# Patient Record
Sex: MALE | Race: WHITE
[De-identification: names, ages, dates, MRNs, and addresses within clinical notes are randomized per-mention and may not be internally consistent; named-entity substitution may affect disease eponyms.]

---

## 2018-06-21 ENCOUNTER — HOSPITAL ENCOUNTER (OUTPATIENT)
Dept: HOSPITAL 47 - RADXRYALE | Age: 53
Discharge: HOME | End: 2018-06-21
Attending: PHYSICIAN ASSISTANT
Payer: COMMERCIAL

## 2018-06-21 ENCOUNTER — HOSPITAL ENCOUNTER (EMERGENCY)
Dept: HOSPITAL 47 - EC | Age: 53
Discharge: HOME | End: 2018-06-21
Payer: COMMERCIAL

## 2018-06-21 VITALS — RESPIRATION RATE: 18 BRPM

## 2018-06-21 VITALS — HEART RATE: 55 BPM | TEMPERATURE: 99.7 F | DIASTOLIC BLOOD PRESSURE: 81 MMHG | SYSTOLIC BLOOD PRESSURE: 164 MMHG

## 2018-06-21 DIAGNOSIS — F17.200: ICD-10-CM

## 2018-06-21 DIAGNOSIS — Z88.1: ICD-10-CM

## 2018-06-21 DIAGNOSIS — R10.30: Primary | ICD-10-CM

## 2018-06-21 DIAGNOSIS — K57.92: Primary | ICD-10-CM

## 2018-06-21 LAB
ALBUMIN SERPL-MCNC: 3.8 G/DL (ref 3.5–5)
ALP SERPL-CCNC: 84 U/L (ref 38–126)
ALT SERPL-CCNC: 43 U/L (ref 21–72)
ANION GAP SERPL CALC-SCNC: 12 MMOL/L
AST SERPL-CCNC: 23 U/L (ref 17–59)
BUN SERPL-SCNC: 9 MG/DL (ref 9–20)
CALCIUM SPEC-MCNC: 8.8 MG/DL (ref 8.4–10.2)
CHLORIDE SERPL-SCNC: 104 MMOL/L (ref 98–107)
CHOLEST SERPL-MCNC: 137 MG/DL (ref ?–200)
CO2 SERPL-SCNC: 24 MMOL/L (ref 22–30)
ERYTHROCYTE [DISTWIDTH] IN BLOOD BY AUTOMATED COUNT: 5 M/UL (ref 4.3–5.9)
ERYTHROCYTE [DISTWIDTH] IN BLOOD: 13.8 % (ref 11.5–15.5)
GLUCOSE SERPL-MCNC: 118 MG/DL (ref 74–99)
HCT VFR BLD AUTO: 44.7 % (ref 39–53)
HDLC SERPL-MCNC: 40 MG/DL (ref 40–60)
HGB BLD-MCNC: 15.1 GM/DL (ref 13–17.5)
LDLC SERPL CALC-MCNC: 73 MG/DL (ref 0–99)
MCH RBC QN AUTO: 30.2 PG (ref 25–35)
MCHC RBC AUTO-ENTMCNC: 33.8 G/DL (ref 31–37)
MCV RBC AUTO: 89.4 FL (ref 80–100)
PLATELET # BLD AUTO: 251 K/UL (ref 150–450)
POTASSIUM SERPL-SCNC: 3.8 MMOL/L (ref 3.5–5.1)
PROT SERPL-MCNC: 6.3 G/DL (ref 6.3–8.2)
SODIUM SERPL-SCNC: 140 MMOL/L (ref 137–145)
TRIGL SERPL-MCNC: 119 MG/DL (ref ?–150)
WBC # BLD AUTO: 13.9 K/UL (ref 3.8–10.6)

## 2018-06-21 PROCEDURE — 84443 ASSAY THYROID STIM HORMONE: CPT

## 2018-06-21 PROCEDURE — 99284 EMERGENCY DEPT VISIT MOD MDM: CPT

## 2018-06-21 PROCEDURE — 80061 LIPID PANEL: CPT

## 2018-06-21 PROCEDURE — 74177 CT ABD & PELVIS W/CONTRAST: CPT

## 2018-06-21 PROCEDURE — 82306 VITAMIN D 25 HYDROXY: CPT

## 2018-06-21 PROCEDURE — 80053 COMPREHEN METABOLIC PANEL: CPT

## 2018-06-21 PROCEDURE — 85027 COMPLETE CBC AUTOMATED: CPT

## 2018-06-21 PROCEDURE — 74019 RADEX ABDOMEN 2 VIEWS: CPT

## 2018-06-21 NOTE — CT
EXAMINATION TYPE: CT abdomen pelvis w con

 

DATE OF EXAM: 6/21/2018

 

COMPARISON: NONE

 

INDICATION: Right lower quadrant pain x 3 days, fever.

 

DLP: 1697 mGycm, Automated exposure control for dose reduction was used.

 

CONTRAST:  100 mL of Isovue M300. 

                        Study performed with Oral Contrast

 

TECHNIQUE: Axial images were obtained from above the diaphragm to the pubic rami in the axial plane a
t 5 mm thick sections.  Reconstructed images are reviewed on the computer in the coronal plane.  

 

FINDINGS:

 

Limited CT sections are obtained the lung bases.  The lung bases are clear.

 

CT ABDOMEN:

 

Liver: Normal

 

Spleen: Normal

 

Pancreas: Normal

 

Adrenal glands: The adrenal glands are normal.

 

Gallbladder: Normal  

 

Kidneys: No masses are evident. No hydronephrosis is present.   No cysts are present.  No renal stone
s are evident.

 

Aorta: Vascular calcification is within the aorta. 

 

Inferior vena cava: Normal.

 

CT PELVIS: 

Multiple diverticuli are throughout the sigmoid colon. There is thickening of the distal sigmoid colo
n. Inflammatory changes are adjacent. Findings are compatible with acute diverticulitis. This is slig
htly right of midline. No abscess is evident. No free air is evident.

 

Appendix: Normal as visualized.

 

Urinary bladder: Normal. 

 

Genitourinary structures: Prostate is unremarkable.

 

Osseous structures: No suspicious lytic or sclerotic lesions.

 

IMPRESSIONS:

1.  Acute diverticulitis distal sigmoid colon. This is central and right of midline within the pelvis
.

## 2018-06-21 NOTE — XR
EXAMINATION TYPE: XR abdomen 2V

 

DATE OF EXAM: 6/21/2018

 

COMPARISON: NONE

 

HISTORY: Pain

 

TECHNIQUE: One view abdominal series

 

FINDINGS:  

The osseous structures are intact.  The bowel gas pattern is nonspecific. Calcification the pelvis li
michael vascular..  

 

IMPRESSION:  

1.  Nonspecific abdomen.

## 2018-06-21 NOTE — ED
Abdominal Pain HPI





- General


Chief Complaint: Abdominal Pain


Time Seen by Provider: 06/21/18 16:14


Source: patient, RN notes reviewed


Mode of arrival: ambulatory


Limitations: no limitations





- History of Present Illness


Initial Comments: 





53-year-old male presented to the emergency department for abdominal pain.  

Patient outpatient CT and lab work shows uncomplicated acute diverticulitis.  

Patient has no history.  Denies any bowel surgeries or abdominal surgeries.  

Patient states that he is having mild constipation no diarrhea no melena or 

hematochezia.  Denies any nausea vomiting.  Patient states she's had no fever 

today felt that he had a few of the other day.





- Related Data


 Previous Rx's











 Medication  Instructions  Recorded


 


Ciprofloxacin HCl [Cipro] 500 mg PO Q12HR #20 tablet 06/21/18


 


metroNIDAZOLE [Flagyl] 500 mg PO TID #30 tab 06/21/18











 Allergies











Allergy/AdvReac Type Severity Reaction Status Date / Time


 


cephalexin [From Keflex] Allergy  Rash/Hives Verified 06/21/18 14:48














Review of Systems


ROS Statement: 


Those systems with pertinent positive or pertinent negative responses have been 

documented in the HPI.





ROS Other: All systems not noted in ROS Statement are negative.





Past Medical History


Past Medical History: Diabetes Mellitus, Hypertension


History of Any Multi-Drug Resistant Organisms: None Reported


Past Surgical History: Orthopedic Surgery


Past Psychological History: Depression


Smoking Status: Current every day smoker


Past Alcohol Use History: None Reported


Past Drug Use History: None Reported





General Exam


Limitations: no limitations


General appearance: alert, in no apparent distress


Eye exam: Present: normal appearance, PERRL, EOMI.  Absent: scleral icterus, 

conjunctival injection, periorbital swelling


Respiratory exam: Present: normal lung sounds bilaterally.  Absent: respiratory 

distress, wheezes, rales, rhonchi, stridor


Cardiovascular Exam: Present: regular rate, normal rhythm, normal heart sounds.

  Absent: systolic murmur, diastolic murmur, rubs, gallop, clicks


GI/Abdominal exam: Present: soft, tenderness (Minimal lower abdominal tenderness

), normal bowel sounds.  Absent: distended, guarding, rebound, rigid





Course





 Vital Signs











  06/21/18





  14:47


 


Temperature 98.8 F


 


Pulse Rate 101 H


 


Respiratory 18





Rate 


 


Blood Pressure 170/90


 


O2 Sat by Pulse 98





Oximetry 














Medical Decision Making





- Medical Decision Making





53-year-old male presented from it for abdominal pain outpatient CT which 

showed acute diverticulitis.  This is oncoming acute diverticulitis has no 

current fever patient will be given oral antibiotics and discharged with close 

follow-up.





- Lab Data


Result diagrams: 


 06/21/18 12:00





 06/21/18 12:00





 Lab Results











  06/21/18 06/21/18 Range/Units





  12:00 12:00 


 


WBC   13.9 H  (3.8-10.6)  k/uL


 


RBC   5.00  (4.30-5.90)  m/uL


 


Hgb   15.1  (13.0-17.5)  gm/dL


 


Hct   44.7  (39.0-53.0)  %


 


MCV   89.4  (80.0-100.0)  fL


 


MCH   30.2  (25.0-35.0)  pg


 


MCHC   33.8  (31.0-37.0)  g/dL


 


RDW   13.8  (11.5-15.5)  %


 


Plt Count   251  (150-450)  k/uL


 


Sodium  140   (137-145)  mmol/L


 


Potassium  3.8   (3.5-5.1)  mmol/L


 


Chloride  104   ()  mmol/L


 


Carbon Dioxide  24   (22-30)  mmol/L


 


Anion Gap  12   mmol/L


 


BUN  9   (9-20)  mg/dL


 


Creatinine  0.67   (0.66-1.25)  mg/dL


 


Est GFR (CKD-EPI)AfAm  >90   (>60 ml/min/1.73 sqM)  


 


Est GFR (CKD-EPI)NonAf  >90   (>60 ml/min/1.73 sqM)  


 


Glucose  118 H   (74-99)  mg/dL


 


Calcium  8.8   (8.4-10.2)  mg/dL


 


Total Bilirubin  0.5   (0.2-1.3)  mg/dL


 


AST  23   (17-59)  U/L


 


ALT  43   (21-72)  U/L


 


Alkaline Phosphatase  84   ()  U/L


 


Total Protein  6.3   (6.3-8.2)  g/dL


 


Albumin  3.8   (3.5-5.0)  g/dL


 


Triglycerides  119   (<150)  mg/dL


 


Cholesterol  137   (<200)  mg/dL


 


LDL Cholesterol, Calc  73   (0-99)  mg/dL


 


HDL Cholesterol  40   (40-60)  mg/dL


 


TSH  1.880   (0.465-4.680)  mIU/L














Disposition


Clinical Impression: 


 Diverticulitis





Disposition: HOME SELF-CARE


Condition: Stable


Instructions:  Diverticulitis (ED), Diverticulitis Diet (ED)


Additional Instructions: 


Please return to the Emergency Department if symptoms worsen or any other 

concerns.


Prescriptions: 


Ciprofloxacin HCl [Cipro] 500 mg PO Q12HR #20 tablet


metroNIDAZOLE [Flagyl] 500 mg PO TID #30 tab


Is patient prescribed a controlled substance at d/c from ED?: No


Referrals: 


Juan Carlos Ortiz DO [Primary Care Provider] - 1-2 days


Time of Disposition: 16:37

## 2018-09-20 ENCOUNTER — HOSPITAL ENCOUNTER (OUTPATIENT)
Dept: HOSPITAL 47 - RADCTMAIN | Age: 53
Discharge: HOME | End: 2018-09-20
Payer: COMMERCIAL

## 2018-09-20 DIAGNOSIS — K57.30: Primary | ICD-10-CM

## 2018-09-20 LAB — BUN SERPL-SCNC: 12 MG/DL (ref 9–20)

## 2018-09-20 PROCEDURE — 84520 ASSAY OF UREA NITROGEN: CPT

## 2018-09-20 PROCEDURE — 82565 ASSAY OF CREATININE: CPT

## 2018-09-20 PROCEDURE — 74177 CT ABD & PELVIS W/CONTRAST: CPT

## 2018-09-20 PROCEDURE — 36415 COLL VENOUS BLD VENIPUNCTURE: CPT

## 2018-09-21 NOTE — CT
EXAMINATION TYPE: CT abdomen pelvis w con

 

DATE OF EXAM: 9/20/2018

 

COMPARISON: CT abdomen and pelvis June 21, 2018

 

HISTORY: Follow up for diverticulitis.

 

CT DLP: 1751 mGycm, Automated Exposure Control for Dose Reduction was Utilized.

 

CONTRAST: 

CT scan of the abdomen and pelvis is performed with oral and with IV Contrast, patient injected with 
100ml mL of Isovue M300.

 

FINDINGS:

 

LUNG BASES: There is stable mild cardiomegaly.

 

LIVER/GB:   No significant abnormality is appreciated.

 

PANCREAS:  No significant abnormality is seen.

 

SPLEEN:  No significant abnormality is seen.

 

ADRENALS:  No significant abnormality is seen.

 

KIDNEYS:  No significant abnormality is seen.

 

BOWEL: Oral contrast reaches level of rectum. There is no suspicious small or large bowel dilatation.
 Normal-appearing appendix is seen descending from cecum. There are diverticula throughout the colon 
most prominent in the left and sigmoid colon. No significant inflammatory changes identified in curre
nt study to suggest active or acute diverticulitis. There is resolution of acute diverticulitis invol
ving the mid to distal sigmoid colon on prior study.

 

PROSTATE/SEMINAL VESICLES:  No gross abnormality seen.

 

LYMPH NODES:  No greater than 1cm abdominal or pelvic lymph nodes are appreciated.

 

OSSEOUS STRUCTURES: There is mild to moderate multilevel anterior and lateral spurring in the visuali
zed thoracic spine. There is mild disc space narrowing with vacuum disc phenomenon L4-L5 level. There
 is moderate disc space narrowing with vacuum disc phenomenon L5-S1 level.

 

OTHER: There is small fat-containing left inguinal hernia redemonstrated. There is tiny fat-containin
g umbilical hernia redemonstrated.

 

IMPRESSION: Interval resolution of acute diverticulitis mid to distal sigmoid colon. Persistent colon
ic diverticulosis without active diverticulitis currently.

## 2018-11-21 ENCOUNTER — HOSPITAL ENCOUNTER (OUTPATIENT)
Dept: HOSPITAL 47 - RADCTMAIN | Age: 53
End: 2018-11-21
Attending: NURSE PRACTITIONER
Payer: COMMERCIAL

## 2018-11-21 DIAGNOSIS — D72.820: ICD-10-CM

## 2018-11-21 DIAGNOSIS — D72.829: Primary | ICD-10-CM

## 2018-11-21 DIAGNOSIS — Z88.1: ICD-10-CM

## 2018-11-21 PROCEDURE — 71250 CT THORAX DX C-: CPT

## 2018-11-21 NOTE — CT
EXAMINATION TYPE: CT chest wo con

 

DATE OF EXAM: 11/21/2018

 

COMPARISON: None

 

HISTORY: family hx of lung ca

 

CT DLP: 809 mGycm, Automated exposure control for dose reduction was used.

 

CONTRAST: Performed injected with 0 mL of Isovue 300.

 

TECHNIQUE: Axial images were obtained at 5 mm thick sections.  Reconstructed images are reviewed on [employer] computer in the coronal plane. 

 

FINDINGS: Portion of the thyroid visualized is normal.

 

No suspicious lung nodules or focal infiltrates are present. No suspicious nodules or infiltrates are
 identified.

 

No enlarged mediastinal or hilar adenopathy is evident.   Scattered shotty lymphadenopathy is present
. The ascending aorta diameter at the level of the main pulmonary artery is 3.5 cm.  The main pulmona
ry artery diameter at the bifurcation is 3 cm.

 

Limited CT sections are obtained through the upper abdomen. Abdomen is essentially unremarkable.

 

IMPRESSIONS:

1. No suspicious acute changes CT chest. 

2. Low dose screening mammography can be performed if the patient is fits screening criteria.

## 2019-02-18 ENCOUNTER — HOSPITAL ENCOUNTER (OUTPATIENT)
Dept: HOSPITAL 47 - RADXRYALE | Age: 54
Discharge: HOME | End: 2019-02-18
Attending: PHYSICIAN ASSISTANT
Payer: COMMERCIAL

## 2019-02-18 DIAGNOSIS — M51.36: ICD-10-CM

## 2019-02-18 DIAGNOSIS — M47.815: ICD-10-CM

## 2019-02-18 DIAGNOSIS — M46.96: ICD-10-CM

## 2019-02-18 DIAGNOSIS — M51.37: Primary | ICD-10-CM

## 2019-02-18 PROCEDURE — 72110 X-RAY EXAM L-2 SPINE 4/>VWS: CPT

## 2019-02-18 NOTE — XR
EXAMINATION TYPE: XR lumbosacral spine min 4V

 

DATE OF EXAM: 2/18/2019

 

COMPARISON: Correlation CT 6/21/2018

 

HISTORY: 53-year-old male with low back pain

 

TECHNIQUE: 5 views

 

FINDINGS: 

5 lumbar type vertebral bodies. Minimal anterior wedging of T12 is unchanged and chronic. Moderate sp
ondylotic change thoracolumbar junction. Mild degenerative disc disease throughout the lumbar spine w
ith more mild to moderate disc height loss at L5-S1. No pars interarticularis defect.

 

 

 

IMPRESSION: 

1. Chronic minimal anterior wedging of T12. No vertebral compression collapse or malalignment.

2. Moderate endplate spondylosis thoracolumbar junction, mild degenerative disc disease throughout th
e remainder of the lumbar spine, and more moderate degenerative disc disease L5-S1.

3. Facet arthropathy lower lumbar spine.

## 2019-07-26 ENCOUNTER — HOSPITAL ENCOUNTER (INPATIENT)
Dept: HOSPITAL 47 - EC | Age: 54
LOS: 4 days | Discharge: HOME | DRG: 392 | End: 2019-07-30
Attending: INTERNAL MEDICINE | Admitting: INTERNAL MEDICINE
Payer: COMMERCIAL

## 2019-07-26 VITALS — BODY MASS INDEX: 42.7 KG/M2

## 2019-07-26 DIAGNOSIS — E11.65: ICD-10-CM

## 2019-07-26 DIAGNOSIS — Z79.84: ICD-10-CM

## 2019-07-26 DIAGNOSIS — Z79.899: ICD-10-CM

## 2019-07-26 DIAGNOSIS — Z79.2: ICD-10-CM

## 2019-07-26 DIAGNOSIS — R00.1: ICD-10-CM

## 2019-07-26 DIAGNOSIS — F32.9: ICD-10-CM

## 2019-07-26 DIAGNOSIS — Z88.1: ICD-10-CM

## 2019-07-26 DIAGNOSIS — F17.210: ICD-10-CM

## 2019-07-26 DIAGNOSIS — G47.33: ICD-10-CM

## 2019-07-26 DIAGNOSIS — I10: ICD-10-CM

## 2019-07-26 DIAGNOSIS — K57.92: Primary | ICD-10-CM

## 2019-07-26 LAB
ALBUMIN SERPL-MCNC: 4.1 G/DL (ref 3.5–5)
ALP SERPL-CCNC: 68 U/L (ref 38–126)
ALT SERPL-CCNC: 22 U/L (ref 21–72)
ANION GAP SERPL CALC-SCNC: 12 MMOL/L
AST SERPL-CCNC: 20 U/L (ref 17–59)
BASOPHILS # BLD AUTO: 0.1 K/UL (ref 0–0.2)
BASOPHILS NFR BLD AUTO: 0 %
BUN SERPL-SCNC: 15 MG/DL (ref 9–20)
CALCIUM SPEC-MCNC: 9.9 MG/DL (ref 8.4–10.2)
CHLORIDE SERPL-SCNC: 101 MMOL/L (ref 98–107)
CO2 SERPL-SCNC: 28 MMOL/L (ref 22–30)
EOSINOPHIL # BLD AUTO: 0.3 K/UL (ref 0–0.7)
EOSINOPHIL NFR BLD AUTO: 2 %
ERYTHROCYTE [DISTWIDTH] IN BLOOD BY AUTOMATED COUNT: 5.42 M/UL (ref 4.3–5.9)
ERYTHROCYTE [DISTWIDTH] IN BLOOD: 13 % (ref 11.5–15.5)
GLUCOSE SERPL-MCNC: 229 MG/DL (ref 74–99)
GLUCOSE UR QL: (no result)
HCT VFR BLD AUTO: 47.9 % (ref 39–53)
HGB BLD-MCNC: 15.9 GM/DL (ref 13–17.5)
LYMPHOCYTES # SPEC AUTO: 4.5 K/UL (ref 1–4.8)
LYMPHOCYTES NFR SPEC AUTO: 29 %
MCH RBC QN AUTO: 29.3 PG (ref 25–35)
MCHC RBC AUTO-ENTMCNC: 33.2 G/DL (ref 31–37)
MCV RBC AUTO: 88.3 FL (ref 80–100)
MONOCYTES # BLD AUTO: 1 K/UL (ref 0–1)
MONOCYTES NFR BLD AUTO: 6 %
NEUTROPHILS # BLD AUTO: 9.2 K/UL (ref 1.3–7.7)
NEUTROPHILS NFR BLD AUTO: 60 %
PH UR: 6 [PH] (ref 5–8)
PLATELET # BLD AUTO: 290 K/UL (ref 150–450)
POTASSIUM SERPL-SCNC: 3.7 MMOL/L (ref 3.5–5.1)
PROT SERPL-MCNC: 6.9 G/DL (ref 6.3–8.2)
SODIUM SERPL-SCNC: 141 MMOL/L (ref 137–145)
SP GR UR: 1.03 (ref 1–1.03)
UROBILINOGEN UR QL STRIP: <2 MG/DL (ref ?–2)
WBC # BLD AUTO: 15.3 K/UL (ref 3.8–10.6)

## 2019-07-26 PROCEDURE — 74177 CT ABD & PELVIS W/CONTRAST: CPT

## 2019-07-26 PROCEDURE — 87040 BLOOD CULTURE FOR BACTERIA: CPT

## 2019-07-26 PROCEDURE — 96365 THER/PROPH/DIAG IV INF INIT: CPT

## 2019-07-26 PROCEDURE — 81003 URINALYSIS AUTO W/O SCOPE: CPT

## 2019-07-26 PROCEDURE — 83690 ASSAY OF LIPASE: CPT

## 2019-07-26 PROCEDURE — 85025 COMPLETE CBC W/AUTO DIFF WBC: CPT

## 2019-07-26 PROCEDURE — 99285 EMERGENCY DEPT VISIT HI MDM: CPT

## 2019-07-26 PROCEDURE — 80053 COMPREHEN METABOLIC PANEL: CPT

## 2019-07-26 PROCEDURE — 80048 BASIC METABOLIC PNL TOTAL CA: CPT

## 2019-07-26 PROCEDURE — 83605 ASSAY OF LACTIC ACID: CPT

## 2019-07-26 PROCEDURE — 36415 COLL VENOUS BLD VENIPUNCTURE: CPT

## 2019-07-26 PROCEDURE — 96361 HYDRATE IV INFUSION ADD-ON: CPT

## 2019-07-26 NOTE — CT
EXAMINATION TYPE: CT abdomen pelvis w con

 

DATE OF EXAM: 7/26/2019

 

COMPARISON: 9/20/2018

 

HISTORY: abdominal pain, hx of diverticulitis.

 

CT DLP: 1812.8 mGycm

Automated exposure control for dose reduction was used.

 

TECHNIQUE:  Helical acquisition of images was performed from the lung bases through the pelvis.

 

CONTRAST: 

Performed without Oral Contrast and with IV Contrast, patient injected with 100 mL of Isovue 300.

 

FINDINGS: 

 

LUNG BASES: No findings, but mild cardiomegaly present.

 

LIVER/GB: No significant abnormality is appreciated.

 

PANCREAS: No significant abnormality is seen.

 

SPLEEN: No significant abnormality is seen.

 

ADRENALS: No significant abnormality is seen.

 

KIDNEYS: No significant abnormality is seen.

 

FREE AIR:  No free air is visualized.

 

RETROPERITONEAL ADENOPATHY:  None visualized

 

REPRODUCTIVE ORGANS: No significant abnormality is seen

 

URINARY BLADDER:  No significant abnormality is seen.

 

PELVIC ADENOPATHY:  None visualized.

 

OSSEOUS STRUCTURES:  No significant abnormality is seen.

 

BOWEL:  There are marked diverticulosis changes throughout the sigmoid colon and the descending colon
, and if colonoscopy has not been obtained recently, then eventual follow-up characterization is mague
mmended to assure normal underlying mucosa. 

 

At the splenic flexure there is pericolonic edematous reticulation of the proximal descending mesocol
on. This is associated with mild thickening and indistinctness to the lateral conal fascia and the la
teral margin of Gerota's fascia. Findings are consistent with mild diverticulitis.

 

OTHER: No acute vascular findings.

 

IMPRESSION: 

MILD DIVERTICULITIS INVOLVING THE PROXIMAL MOST DESCENDING COLON.

## 2019-07-26 NOTE — ED
General Adult HPI





<Alirio Abraham - Last Filed: 07/26/19 22:05>





- General


Source: patient, RN notes reviewed, old records reviewed


Mode of arrival: ambulatory


Limitations: no limitations





<Ramana Hassan - Last Filed: 07/26/19 22:11>





- General


Chief complaint: Abdominal Pain


Stated complaint: Abn labs


Time Seen by Provider: 07/26/19 18:41





- History of Present Illness


Initial comments: 


54-year-old male patient passed no history of type 2 diabetes, hypertension, 

recurrent diverticulitis presents to ED with approximately 2 weeks of left lower

quadrant pain.  Patient has been followed by his primary care provider Dr. Mclain and has been taking oral antibiotics for presumed diverticulitis.  

Patient reports states that approximately 1.5 weeks of ciprofloxacin, he 

discontinued taking ciprofloxacin on Tuesday due to a rash and itchiness that he

developed.  Patient had his labs drawn by his primary care provider yesterday, 

the results returned today which showed that he is not having laboratory 

improvements.  He was then recommended to present to the ED for presumed 

admission and IV antibiotics.  Patient reports that he has a severe ALLERGY to 

Keflex, however has been taking penicillins without difficulty.  He reports 

recently taking Augmentin without difficulty.  Denies any other complaints at 

this time.  Denies any nausea vomiting diarrhea, chest pain or shortness of 

breath.  Does report that he has had a subjective waxing and waning fever over 

this time period. 





Systemic: Pt denies fatigue, fever/chills, rash. Pt denies weakness, night 

sweats, weight loss. 


Neuro: Pt denies headache, visual disturbances, syncope or pre-syncope.


HEENT: Pt denies ocular discharge or irritation, otalgia, rhinorrhea, 

pharyngitis or notable lymphadenopathy. 


Cardiopulmonary: Pt denies chest pain, SOB, heart palpitations, dyspnea on 

exertion.  


Abdominal/GI: Pt denies n/v/d. 


: Pt denies dysuria, burning w/ urination, frequency/urgency. Denies new onset

urinary or bowel incontinence.  


MSK: Pt denies myalgia, loss of strength or function in extremities. 


Neuro: Pt denies new onset weakness, paresthesias. 


 (Ramana Hassan)





- Related Data


                                Home Medications











 Medication  Instructions  Recorded  Confirmed


 


Hydrochlorothiazide [Hydrodiuril] 25 mg PO DAILY 06/21/18 07/26/19


 


Metoprolol Succinate [Toprol XL] 200 mg PO DAILY 06/21/18 07/26/19


 


PARoxetine HCL [Paxil Cr] 37.5 mg PO DAILY 06/21/18 07/26/19


 


Amoxic-Pot Clav 875-125Mg 1 tab PO Q12HR 07/26/19 07/26/19





[Augmentin 875-125]   


 


Empagliflozin [Jardiance] 25 mg PO DAILY 07/26/19 07/26/19


 


Ergocalciferol (Vitamin D2) 50,000 unit PO Q7D 07/26/19 07/26/19





[Vitamin D2]   


 


metFORMIN HCL ER [Glucophage Xr] 1,000 mg PO BID 07/26/19 07/26/19











                                    Allergies











Allergy/AdvReac Type Severity Reaction Status Date / Time


 


cephalexin [From Keflex] Allergy  Rash/Hives Verified 07/26/19 19:39


 


ciprofloxacin [From Cipro] Allergy  Rash/Hives Verified 07/26/19 19:39














Review of Systems


ROS Other: All systems not noted in ROS Statement are negative.





<Alirio Abraham - Last Filed: 07/26/19 22:05>


ROS Other: All systems not noted in ROS Statement are negative.





<Ramana Hassan - Last Filed: 07/26/19 22:11>


ROS Statement: 


Those systems with pertinent positive or pertinent negative responses have been 

documented in the HPI.








Past Medical History


Past Medical History: Diabetes Mellitus, Hypertension


History of Any Multi-Drug Resistant Organisms: None Reported


Past Surgical History: Orthopedic Surgery


Additional Past Surgical History / Comment(s): colonoscopy, R finger 

reattachment, sinus surgery


Past Psychological History: Depression


Smoking Status: Current every day smoker


Past Alcohol Use History: None Reported


Past Drug Use History: None Reported





<Ramana Hassan - Last Filed: 07/26/19 22:11>





General Exam


Limitations: no limitations





<Ramana Hassan - Last Filed: 07/26/19 22:11>





- General Exam Comments


Initial Comments: 





Constitutional: NAD, AOX3, Pt has pleasant affect. 


HEENT: NC/AT, trachea midline, neck supple, no lymphadenopathy. Posterior 

pharynx non erythematous, without exudates. External ears appear normal, without

discharge. Mucous membranes moist. Eyes PERRLA, EOM intact. There is no scleral 

icterus. No pallor noted. 


Cardiopulmonary: RRR, no murmurs, rubs or gallops, no JVD noted. Lungs CTAB in 

anterior and posterior fields. No peripheral edema. 


Abdominal exam: Abdomen soft and non-distended.  Abdomen mildly tender to 

palpation in left lower quadrant, no ecchymoses, and no guarding no JVD.. Bowel 

sounds active in LLQ. No hepatosplenomegaly. No ecchymosis


Neuro: CN II-XII grossly intact. No nuchal rigidity. No raccon eyes, no rodriguez 

sign, no hemotympanum. No cervical spinal tenderness. 


MSK: No posterior calf tenderness bilaterally, homans sign negative bilaterally.

Posterior tibialis and radial pulse +2 bilaterally. Sensation intact in upper 

and lower extremities. Full active ROM in upper and lower extremities, 5/5 

stregnth. 


 (Ramana Hassan)





Course





<Alirio Abraham - Last Filed: 07/26/19 22:05>


                                   Vital Signs











  07/26/19 07/26/19 07/26/19





  17:52 20:29 21:25


 


Temperature 98.6 F 98.6 F 98 F


 


Pulse Rate 74 63 56 L


 


Respiratory 18 18 18





Rate   


 


Blood Pressure 138/88 141/81 141/78


 


O2 Sat by Pulse 97 97 96





Oximetry   














- Reevaluation(s)


Reevaluation #1: 





07/26/19 22:05


PA supervision: I proceeded evaluate this case and the patient will be admitted 

for IV antibiotics.  I did discuss case with Dr. Tafoya (Alirio Abraham)





Medical Decision Making





- Lab Data


Result diagrams: 


                                 07/26/19 19:53





                                 07/26/19 19:53





<Alirio Abraham - Last Filed: 07/26/19 22:05>





- Lab Data


Result diagrams: 


                                 07/26/19 19:53





                                 07/26/19 19:53





<Ramana Hassan - Last Filed: 07/26/19 22:11>





- Medical Decision Making


54-year-old male patient passed no history of type 2 diabetes, hypertension, 

recurrent diverticulitis presents to ED with approximately 2 weeks of left lower

quadrant pain.  Patient has been followed by his primary care provider Dr. Mclain and has been taking oral antibiotics for presumed diverticulitis.  

Patient reports states that approximately 1.5 weeks of ciprofloxacin, he 

discontinued taking ciprofloxacin on Tuesday due to a rash and itchiness that he

developed.  Patient had his labs drawn by his primary care provider yesterday, 

the results returned today which showed that he is not having laboratory 

improvements.  He was then recommended to present to the ED for presumed 

admission and IV antibiotics.  Patient reports that he has a severe ALLERGY to 

Keflex, however has been taking penicillins without difficulty.  He reports 

recently taking Augmentin without difficulty.  Denies any other complaints at 

this time.  Denies any nausea vomiting diarrhea, chest pain or shortness of 

breath.  Does report that he has had a subjective waxing and waning fever over 

this time period.  Patient bowel sounds stable, afebrile. Abdomen soft and non-

distended.  Abdomen mildly tender to palpation in left lower quadrant, no 

ecchymoses, and no guarding no JVD. Bowel sounds active in LLQ. No 

hepatosplenomegaly. No ecchymosis.  Laboratory investigations revealed mild 

leukocytosis of 15.  CVA revealed mild hyperglycemia.  Lactic acid 1.8.  UA 

revealed +4 glucose.  CT abdomen and pelvis displayed mild diverticulitis 

involving proximalmost ascending colon.  Patient started on Zosyn.  Patient 

admitted to Dr. Tafoya. Case discused with Dr. Abraham. 


 (Ramana Hassan)





- Lab Data


                                   Lab Results











  07/26/19 07/26/19 07/26/19 Range/Units





  19:53 19:53 19:53 


 


WBC  15.3 H    (3.8-10.6)  k/uL


 


RBC  5.42    (4.30-5.90)  m/uL


 


Hgb  15.9    (13.0-17.5)  gm/dL


 


Hct  47.9    (39.0-53.0)  %


 


MCV  88.3    (80.0-100.0)  fL


 


MCH  29.3    (25.0-35.0)  pg


 


MCHC  33.2    (31.0-37.0)  g/dL


 


RDW  13.0    (11.5-15.5)  %


 


Plt Count  290    (150-450)  k/uL


 


Neutrophils %  60    %


 


Lymphocytes %  29    %


 


Monocytes %  6    %


 


Eosinophils %  2    %


 


Basophils %  0    %


 


Neutrophils #  9.2 H    (1.3-7.7)  k/uL


 


Lymphocytes #  4.5    (1.0-4.8)  k/uL


 


Monocytes #  1.0    (0-1.0)  k/uL


 


Eosinophils #  0.3    (0-0.7)  k/uL


 


Basophils #  0.1    (0-0.2)  k/uL


 


Sodium   141   (137-145)  mmol/L


 


Potassium   3.7   (3.5-5.1)  mmol/L


 


Chloride   101   ()  mmol/L


 


Carbon Dioxide   28   (22-30)  mmol/L


 


Anion Gap   12   mmol/L


 


BUN   15   (9-20)  mg/dL


 


Creatinine   0.83   (0.66-1.25)  mg/dL


 


Est GFR (CKD-EPI)AfAm   >90   (>60 ml/min/1.73 sqM)  


 


Est GFR (CKD-EPI)NonAf   >90   (>60 ml/min/1.73 sqM)  


 


Glucose   229 H   (74-99)  mg/dL


 


Plasma Lactic Acid Demetri    1.8  (0.7-2.0)  mmol/L


 


Calcium   9.9   (8.4-10.2)  mg/dL


 


Total Bilirubin   0.5   (0.2-1.3)  mg/dL


 


AST   20   (17-59)  U/L


 


ALT   22   (21-72)  U/L


 


Alkaline Phosphatase   68   ()  U/L


 


Total Protein   6.9   (6.3-8.2)  g/dL


 


Albumin   4.1   (3.5-5.0)  g/dL


 


Lipase   194   ()  U/L


 


Urine Color     


 


Urine Appearance     (Clear)  


 


Urine pH     (5.0-8.0)  


 


Ur Specific Gravity     (1.001-1.035)  


 


Urine Protein     (Negative)  


 


Urine Glucose (UA)     (Negative)  


 


Urine Ketones     (Negative)  


 


Urine Blood     (Negative)  


 


Urine Nitrite     (Negative)  


 


Urine Bilirubin     (Negative)  


 


Urine Urobilinogen     (<2.0)  mg/dL


 


Ur Leukocyte Esterase     (Negative)  














  07/26/19 Range/Units





  19:53 


 


WBC   (3.8-10.6)  k/uL


 


RBC   (4.30-5.90)  m/uL


 


Hgb   (13.0-17.5)  gm/dL


 


Hct   (39.0-53.0)  %


 


MCV   (80.0-100.0)  fL


 


MCH   (25.0-35.0)  pg


 


MCHC   (31.0-37.0)  g/dL


 


RDW   (11.5-15.5)  %


 


Plt Count   (150-450)  k/uL


 


Neutrophils %   %


 


Lymphocytes %   %


 


Monocytes %   %


 


Eosinophils %   %


 


Basophils %   %


 


Neutrophils #   (1.3-7.7)  k/uL


 


Lymphocytes #   (1.0-4.8)  k/uL


 


Monocytes #   (0-1.0)  k/uL


 


Eosinophils #   (0-0.7)  k/uL


 


Basophils #   (0-0.2)  k/uL


 


Sodium   (137-145)  mmol/L


 


Potassium   (3.5-5.1)  mmol/L


 


Chloride   ()  mmol/L


 


Carbon Dioxide   (22-30)  mmol/L


 


Anion Gap   mmol/L


 


BUN   (9-20)  mg/dL


 


Creatinine   (0.66-1.25)  mg/dL


 


Est GFR (CKD-EPI)AfAm   (>60 ml/min/1.73 sqM)  


 


Est GFR (CKD-EPI)NonAf   (>60 ml/min/1.73 sqM)  


 


Glucose   (74-99)  mg/dL


 


Plasma Lactic Acid Demetri   (0.7-2.0)  mmol/L


 


Calcium   (8.4-10.2)  mg/dL


 


Total Bilirubin   (0.2-1.3)  mg/dL


 


AST   (17-59)  U/L


 


ALT   (21-72)  U/L


 


Alkaline Phosphatase   ()  U/L


 


Total Protein   (6.3-8.2)  g/dL


 


Albumin   (3.5-5.0)  g/dL


 


Lipase   ()  U/L


 


Urine Color  Yellow  


 


Urine Appearance  Clear  (Clear)  


 


Urine pH  6.0  (5.0-8.0)  


 


Ur Specific Gravity  1.031  (1.001-1.035)  


 


Urine Protein  Negative  (Negative)  


 


Urine Glucose (UA)  4+ H  (Negative)  


 


Urine Ketones  Negative  (Negative)  


 


Urine Blood  Negative  (Negative)  


 


Urine Nitrite  Negative  (Negative)  


 


Urine Bilirubin  Negative  (Negative)  


 


Urine Urobilinogen  <2.0  (<2.0)  mg/dL


 


Ur Leukocyte Esterase  Negative  (Negative)  














Disposition





<Alirio Abraham - Last Filed: 07/26/19 22:05>


Is patient prescribed a controlled substance at d/c from ED?: No





<Ramana Hassan - Last Filed: 07/26/19 22:11>


Clinical Impression: 


 Diverticulitis





Disposition: ADMITTED AS IP TO THIS HOSP


Condition: Serious


Referrals: 


Juan Carlos Ortiz DO [Primary Care Provider] - 1-2 days

## 2019-07-27 LAB
ALBUMIN SERPL-MCNC: 3.3 G/DL (ref 3.5–5)
ALP SERPL-CCNC: 61 U/L (ref 38–126)
ALT SERPL-CCNC: 25 U/L (ref 21–72)
ANION GAP SERPL CALC-SCNC: 6 MMOL/L
AST SERPL-CCNC: 18 U/L (ref 17–59)
BASOPHILS # BLD AUTO: 0.1 K/UL (ref 0–0.2)
BASOPHILS NFR BLD AUTO: 1 %
BUN SERPL-SCNC: 12 MG/DL (ref 9–20)
CALCIUM SPEC-MCNC: 8.8 MG/DL (ref 8.4–10.2)
CHLORIDE SERPL-SCNC: 106 MMOL/L (ref 98–107)
CO2 SERPL-SCNC: 28 MMOL/L (ref 22–30)
EOSINOPHIL # BLD AUTO: 0.4 K/UL (ref 0–0.7)
EOSINOPHIL NFR BLD AUTO: 4 %
ERYTHROCYTE [DISTWIDTH] IN BLOOD BY AUTOMATED COUNT: 4.86 M/UL (ref 4.3–5.9)
ERYTHROCYTE [DISTWIDTH] IN BLOOD: 14.6 % (ref 11.5–15.5)
GLUCOSE BLD-MCNC: 125 MG/DL (ref 75–99)
GLUCOSE BLD-MCNC: 180 MG/DL (ref 75–99)
GLUCOSE BLD-MCNC: 191 MG/DL (ref 75–99)
GLUCOSE BLD-MCNC: 244 MG/DL (ref 75–99)
GLUCOSE SERPL-MCNC: 179 MG/DL (ref 74–99)
HCT VFR BLD AUTO: 44.9 % (ref 39–53)
HGB BLD-MCNC: 14.5 GM/DL (ref 13–17.5)
LYMPHOCYTES # SPEC AUTO: 2.9 K/UL (ref 1–4.8)
LYMPHOCYTES NFR SPEC AUTO: 32 %
MCH RBC QN AUTO: 29.8 PG (ref 25–35)
MCHC RBC AUTO-ENTMCNC: 32.3 G/DL (ref 31–37)
MCV RBC AUTO: 92.3 FL (ref 80–100)
MONOCYTES # BLD AUTO: 0.6 K/UL (ref 0–1)
MONOCYTES NFR BLD AUTO: 7 %
NEUTROPHILS # BLD AUTO: 5 K/UL (ref 1.3–7.7)
NEUTROPHILS NFR BLD AUTO: 55 %
PLATELET # BLD AUTO: 274 K/UL (ref 150–450)
POTASSIUM SERPL-SCNC: 4.2 MMOL/L (ref 3.5–5.1)
PROT SERPL-MCNC: 5.9 G/DL (ref 6.3–8.2)
SODIUM SERPL-SCNC: 140 MMOL/L (ref 137–145)
WBC # BLD AUTO: 9.1 K/UL (ref 3.8–10.6)

## 2019-07-27 RX ADMIN — INSULIN ASPART SCH: 100 INJECTION, SOLUTION INTRAVENOUS; SUBCUTANEOUS at 16:53

## 2019-07-27 RX ADMIN — CEFAZOLIN SCH MLS/HR: 330 INJECTION, POWDER, FOR SOLUTION INTRAMUSCULAR; INTRAVENOUS at 04:00

## 2019-07-27 RX ADMIN — PIPERACILLIN AND TAZOBACTAM SCH MLS/HR: 3; .375 INJECTION, POWDER, FOR SOLUTION INTRAVENOUS at 03:59

## 2019-07-27 RX ADMIN — INSULIN ASPART SCH: 100 INJECTION, SOLUTION INTRAVENOUS; SUBCUTANEOUS at 13:14

## 2019-07-27 RX ADMIN — PIPERACILLIN AND TAZOBACTAM SCH MLS/HR: 3; .375 INJECTION, POWDER, FOR SOLUTION INTRAVENOUS at 10:48

## 2019-07-27 RX ADMIN — INSULIN ASPART SCH: 100 INJECTION, SOLUTION INTRAVENOUS; SUBCUTANEOUS at 07:48

## 2019-07-27 RX ADMIN — PIPERACILLIN AND TAZOBACTAM SCH MLS/HR: 3; .375 INJECTION, POWDER, FOR SOLUTION INTRAVENOUS at 20:41

## 2019-07-27 RX ADMIN — CEFAZOLIN SCH MLS/HR: 330 INJECTION, POWDER, FOR SOLUTION INTRAMUSCULAR; INTRAVENOUS at 10:48

## 2019-07-27 RX ADMIN — CEFAZOLIN SCH MLS/HR: 330 INJECTION, POWDER, FOR SOLUTION INTRAMUSCULAR; INTRAVENOUS at 00:29

## 2019-07-27 RX ADMIN — CEFAZOLIN SCH MLS/HR: 330 INJECTION, POWDER, FOR SOLUTION INTRAMUSCULAR; INTRAVENOUS at 16:45

## 2019-07-27 RX ADMIN — INSULIN ASPART SCH UNIT: 100 INJECTION, SOLUTION INTRAVENOUS; SUBCUTANEOUS at 20:41

## 2019-07-27 RX ADMIN — HYDROMORPHONE HYDROCHLORIDE PRN MG: 1 INJECTION, SOLUTION INTRAMUSCULAR; INTRAVENOUS; SUBCUTANEOUS at 10:48

## 2019-07-27 RX ADMIN — HYDROMORPHONE HYDROCHLORIDE PRN MG: 1 INJECTION, SOLUTION INTRAMUSCULAR; INTRAVENOUS; SUBCUTANEOUS at 16:50

## 2019-07-28 LAB
GLUCOSE BLD-MCNC: 128 MG/DL (ref 75–99)
GLUCOSE BLD-MCNC: 168 MG/DL (ref 75–99)
GLUCOSE BLD-MCNC: 173 MG/DL (ref 75–99)
GLUCOSE BLD-MCNC: 182 MG/DL (ref 75–99)

## 2019-07-28 RX ADMIN — HYDROMORPHONE HYDROCHLORIDE PRN MG: 1 INJECTION, SOLUTION INTRAMUSCULAR; INTRAVENOUS; SUBCUTANEOUS at 07:30

## 2019-07-28 RX ADMIN — INSULIN ASPART SCH UNIT: 100 INJECTION, SOLUTION INTRAVENOUS; SUBCUTANEOUS at 07:30

## 2019-07-28 RX ADMIN — HYDROMORPHONE HYDROCHLORIDE PRN MG: 1 INJECTION, SOLUTION INTRAMUSCULAR; INTRAVENOUS; SUBCUTANEOUS at 20:37

## 2019-07-28 RX ADMIN — PIPERACILLIN AND TAZOBACTAM SCH MLS/HR: 3; .375 INJECTION, POWDER, FOR SOLUTION INTRAVENOUS at 20:36

## 2019-07-28 RX ADMIN — CEFAZOLIN SCH MLS/HR: 330 INJECTION, POWDER, FOR SOLUTION INTRAMUSCULAR; INTRAVENOUS at 20:37

## 2019-07-28 RX ADMIN — INSULIN ASPART SCH: 100 INJECTION, SOLUTION INTRAVENOUS; SUBCUTANEOUS at 17:12

## 2019-07-28 RX ADMIN — HEPARIN SODIUM SCH UNIT: 5000 INJECTION, SOLUTION INTRAVENOUS; SUBCUTANEOUS at 23:44

## 2019-07-28 RX ADMIN — CEFAZOLIN SCH: 330 INJECTION, POWDER, FOR SOLUTION INTRAMUSCULAR; INTRAVENOUS at 03:47

## 2019-07-28 RX ADMIN — HYDROMORPHONE HYDROCHLORIDE PRN MG: 1 INJECTION, SOLUTION INTRAMUSCULAR; INTRAVENOUS; SUBCUTANEOUS at 12:10

## 2019-07-28 RX ADMIN — CEFAZOLIN SCH MLS/HR: 330 INJECTION, POWDER, FOR SOLUTION INTRAMUSCULAR; INTRAVENOUS at 15:22

## 2019-07-28 RX ADMIN — HEPARIN SODIUM SCH UNIT: 5000 INJECTION, SOLUTION INTRAVENOUS; SUBCUTANEOUS at 15:21

## 2019-07-28 RX ADMIN — PIPERACILLIN AND TAZOBACTAM SCH MLS/HR: 3; .375 INJECTION, POWDER, FOR SOLUTION INTRAVENOUS at 03:59

## 2019-07-28 RX ADMIN — CEFAZOLIN SCH: 330 INJECTION, POWDER, FOR SOLUTION INTRAMUSCULAR; INTRAVENOUS at 08:40

## 2019-07-28 RX ADMIN — PIPERACILLIN AND TAZOBACTAM SCH MLS/HR: 3; .375 INJECTION, POWDER, FOR SOLUTION INTRAVENOUS at 12:29

## 2019-07-28 RX ADMIN — INSULIN ASPART SCH UNIT: 100 INJECTION, SOLUTION INTRAVENOUS; SUBCUTANEOUS at 12:10

## 2019-07-28 RX ADMIN — INSULIN ASPART SCH UNIT: 100 INJECTION, SOLUTION INTRAVENOUS; SUBCUTANEOUS at 21:42

## 2019-07-28 NOTE — P.HPIM
History of Present Illness


H&P Date: 07/27/19


Chief Complaint: Abdominal pain





Patient is a 54-year-old male with a known history of diabetes type 2 

non-insulin-dependent, hypertension, upset to sleep apnea and recurrent d

iverticulitis came to ER with complaints of left lower quadrant abdominal pain. 

Patient has been followed by his primary care provider Dr. Mclain and has 

been taking oral antibiotics for presumed diverticulitis.  Patient reports 

states that approximately 1.5 weeks of ciprofloxacin, he discontinued taking 

ciprofloxacin on Tuesday due to a rash and itchiness that he developed.  Patient

had his labs drawn by his primary care provider, the results returned today 

which showed that he is not having laboratory improvements.  He was then 

recommended to present to the ED for presumed admission and IV antibiotics.  

Patient reports that he has a severe ALLERGY to Keflex, however has been taking 

penicillins without difficulty.  He reports recently taking Augmentin without 

difficulty.  Denies any other complaints at this time.  Denies any nausea 

vomiting diarrhea, chest pain or shortness of breath.  Does report that he has 

had a subjective waxing and waning fever over this time period. 





CT abdomen and pelvis showed mild diverticulitis involving the proximal most 

descending colon.











Review of Systems





Constitutional: Patient denies any fever or chills .  No generalized weakness or

weight loss.  


Abdomen: Patient denied nausea vomiting and diarrhea.  Patient does have 

abdominal pain.


Cardiovascular: Patient denies any chest pain or short of breath no 

palpitations.


Respiratory: patient denied any cough is from production.  No shortness of 

breath


Neurologic: Patient denied any numbness or tingling headache.


Musculoskeletal: Patient denies any complaints of joint swelling or deformity.


Skin: Negative


Psychiatric: Negative


Endocrine: No heat or cold intolerance.  No recent weight gain.


Genitourinary: No dysuria or hematuria.


All other 14 point ROS negative except the above





Past Medical History


Past Medical History: Diabetes Mellitus, Hypertension, Pneumonia, Sleep 

Apnea/CPAP/BIPAP


History of Any Multi-Drug Resistant Organisms: None Reported


Past Surgical History: Orthopedic Surgery


Additional Past Surgical History / Comment(s): colonoscopy, R finger 

reattachment, sinus surgery


Past Anesthesia/Blood Transfusion Reactions: No Reported Reaction


Past Psychological History: Depression


Smoking Status: Current every day smoker


Past Alcohol Use History: None Reported


Past Drug Use History: None Reported





- Past Family History


  ** Father


Family Medical History: CVA/TIA





  ** Mother


Additional Family Medical History / Comment(s): heart valve problems





Medications and Allergies


                                Home Medications











 Medication  Instructions  Recorded  Confirmed  Type


 


Hydrochlorothiazide [Hydrodiuril] 25 mg PO DAILY 06/21/18 07/26/19 History


 


Metoprolol Succinate [Toprol XL] 200 mg PO DAILY 06/21/18 07/26/19 History


 


PARoxetine HCL [Paxil Cr] 37.5 mg PO DAILY 06/21/18 07/26/19 History


 


Amoxic-Pot Clav 875-125Mg 1 tab PO Q12HR 07/26/19 07/26/19 History





[Augmentin 875-125]    


 


Empagliflozin [Jardiance] 25 mg PO DAILY 07/26/19 07/26/19 History


 


Ergocalciferol (Vitamin D2) 50,000 unit PO Q7D 07/26/19 07/26/19 History





[Vitamin D2]    


 


metFORMIN HCL ER [Glucophage Xr] 1,000 mg PO BID 07/26/19 07/26/19 History








                                    Allergies











Allergy/AdvReac Type Severity Reaction Status Date / Time


 


cephalexin [From Keflex] Allergy  Rash/Hives Verified 07/26/19 19:39


 


ciprofloxacin [From Cipro] Allergy  Rash/Hives Verified 07/26/19 19:39














Physical Exam


Vitals: 


                                   Vital Signs











  Temp Pulse Resp BP BP Pulse Ox


 


 07/28/19 05:00  97 F L  59 L  20  143/80   98


 


 07/27/19 21:23  97.7 F  54 L  20  167/97   97


 


 07/27/19 12:15  97.3 F L  51 L  16   134/64  97








                                Intake and Output











 07/27/19 07/28/19 07/28/19





 22:59 06:59 14:59


 


Intake Total 200 100 


 


Balance 200 100 


 


Intake:   


 


  Oral 200 100 


 


Other:   


 


  Voiding Method Toilet  


 


  # Voids 1 2 














PHYSICAL EXAMINATION: 


Patient is lying in the bed comfortably, no acute distress, awake alert and 

oriented.. 


HEENT: Normocephalic. Neck is supple. Pupils reactive. Nostrils clear. Oral 

cavity is moist. Ears reveal no drainage. 


Neck reveals no JVD, carotid bruits, or thyromegaly. 


CHEST EXAMINATION: Trachea is central. Symmetrical expansion. Lung fields clear 

to auscultation and percussion. 


CARDIAC: Normal S1, S2 with no gallops. No murmurs 


ABDOMEN: Soft.  Left lower quadrant tenderness.  No guarding no rigidity.  Bowel

sounds normal. No organomegaly. No abdominal bruits. 


Extremities: reveal no edema.  No clubbing or cyanosis


Neurologically awake, alert, oriented x3 with well-coordinated movements.  No 

focal deficits noted


Skin: No rash or skin lesions. 


Psychiatric: Coperative.  Nonsuicidal


Musculoskeletal: No joint swelling or deformity.  Normal range of motion.








Results


CBC & Chem 7: 


                                 07/27/19 10:55





                                 07/27/19 10:55


Labs: 


                  Abnormal Lab Results - Last 24 Hours (Table)











  07/27/19 07/27/19 07/27/19 Range/Units





  10:55 12:07 16:53 


 


Glucose  179 H    (74-99)  mg/dL


 


POC Glucose (mg/dL)   180 H  125 H  (75-99)  mg/dL


 


Total Protein  5.9 L    (6.3-8.2)  g/dL


 


Albumin  3.3 L    (3.5-5.0)  g/dL














  07/27/19 07/28/19 Range/Units





  20:20 07:05 


 


Glucose    (74-99)  mg/dL


 


POC Glucose (mg/dL)  244 H  173 H  (75-99)  mg/dL


 


Total Protein    (6.3-8.2)  g/dL


 


Albumin    (3.5-5.0)  g/dL








                      Microbiology - Last 24 Hours (Table)











 07/26/19 19:53 Blood Culture - Preliminary





 Blood    No Growth after 24 hours














Thrombosis Risk Factor Assmnt





- DVT/VTE Prophylaxis


DVT/VTE Prophylaxis: Pharmacologic Prophylaxis ordered





- Choose All That Apply


Any of the Below Risk Factors Present?: Yes


Each Factor Represents 1 point: Age 41-60 years, Obesity (BMI >25)


Other Risk Factors: No


Other congenital or acquired thrombophilia - If yes, enter type in comment: No


Thrombosis Risk Factor Assessment Total Risk Factor Score: 2


Thrombosis Risk Factor Assessment Level: Low Risk





Assessment and Plan


Assessment: 





Acute recurrent diverticulitis.  Failed outpatient therapy with Augmentin.


Diabetes type 2 non-insulin-dependent


Hypertension


Obstructive sleep apnea


Multiple be stated BMI 41.8


Depression


Nicotine addiction


DVT prophylaxis with heparin subcu





Plan:


Patient be continued on antibiotics the form of Zosyn.  Continue the pain 

medications with Dilaudid.  Currently patient will be started on liquid diet and

advance as tolerated.  Metoprolol is on hold due to bradycardia.  Will hold 

hydrochlorothiazide as well.  Blood pressure is not elevated.  Continue with 

insulin sliding scale.  Symptomatic management for nausea.  Further 

recommendations based on the clinical course.








Time with Patient: Greater than 30

## 2019-07-28 NOTE — P.PN
Subjective


Progress Note Date: 07/28/19


Principal diagnosis: 





Acute Diverticulitis





Patient is a 54-year-old male with a known history of diabetes type 2 

non-insulin-dependent, hypertension, upset to sleep apnea and recurrent 

diverticulitis came to ER with complaints of left lower quadrant abdominal pain.

Patient has been followed by his primary care provider Dr. Mclain and has 

been taking oral antibiotics for presumed diverticulitis.  Patient reports 

states that approximately 1.5 weeks of ciprofloxacin, he discontinued taking 

ciprofloxacin on Tuesday due to a rash and itchiness that he developed.  Patient

had his labs drawn by his primary care provider, the results returned today 

which showed that he is not having laboratory improvements.  He was then 

recommended to present to the ED for presumed admission and IV antibiotics.  

Patient reports that he has a severe ALLERGY to Keflex, however has been taking 

penicillins without difficulty.  He reports recently taking Augmentin without 

difficulty.  Denies any other complaints at this time.  Denies any nausea 

vomiting diarrhea, chest pain or shortness of breath.  Does report that he has 

had a subjective waxing and waning fever over this time period. 





CT abdomen and pelvis showed mild diverticulitis involving the proximal most 

descending colon.





7/28/2019


Patient states that his abdominal pain is better.  Still having some left lower 

quadrant pain.  No leukocytosis.  No fever no chills.  Patient has been 

controlled on IV pain medications.  Started on is being aggressive.  Continued 

on IV antibiotics in the form of Zosyn.  Anticipate discharge next 24 hours with

significant improvement.  Heart rate is in 50s.  Metoprolol is on hold.  No 

history of coronary artery disease as per patient.





Current Medications reviewed





Objective





- Vital Signs


Vital signs: 


                                   Vital Signs











Temp  97.1 F L  07/28/19 13:34


 


Pulse  64   07/28/19 13:34


 


Resp  16   07/28/19 13:34


 


BP  139/81   07/28/19 13:34


 


Pulse Ox  96   07/28/19 13:34








                                 Intake & Output











 07/27/19 07/28/19 07/28/19





 18:59 06:59 18:59


 


Intake Total  300 


 


Balance  300 


 


Intake:   


 


  Oral  300 


 


Other:   


 


  Voiding Method  Toilet 


 


  # Voids 3 2 4


 


  # Bowel Movements 1  














- Exam








PHYSICAL EXAMINATION: 


Patient is lying in the bed comfortably, no acute distress, awake alert and 

oriented.. 


HEENT: Normocephalic. Neck is supple. Pupils reactive. Nostrils clear. Oral 

cavity is moist. Ears reveal no drainage. 


Neck reveals no JVD, carotid bruits, or thyromegaly. 


CHEST EXAMINATION: Trachea is central. Symmetrical expansion. Lung fields clear 

to auscultation and percussion. 


CARDIAC: Normal S1, S2 with no gallops. No murmurs 


ABDOMEN: Soft.  Left lower quadrant tenderness.  No guarding no rigidity.  Bowel

sounds normal. No organomegaly. No abdominal bruits. 


Extremities: reveal no edema.  No clubbing or cyanosis


Neurologically awake, alert, oriented x3 with well-coordinated movements.  No 

focal deficits noted


Skin: No rash or skin lesions. 


Psychiatric: Coperative.  Nonsuicidal


Musculoskeletal: No joint swelling or deformity.  Normal range of motion.








- Labs


CBC & Chem 7: 


                                 07/27/19 10:55





                                 07/27/19 10:55


Labs: 


                  Abnormal Lab Results - Last 24 Hours (Table)











  07/27/19 07/27/19 07/28/19 Range/Units





  16:53 20:20 07:05 


 


POC Glucose (mg/dL)  125 H  244 H  173 H  (75-99)  mg/dL














  07/28/19 Range/Units





  11:39 


 


POC Glucose (mg/dL)  182 H  (75-99)  mg/dL








                      Microbiology - Last 24 Hours (Table)











 07/26/19 19:53 Blood Culture - Preliminary





 Blood    No Growth after 24 hours














Assessment and Plan


Assessment: 





Acute recurrent diverticulitis.  Failed outpatient therapy with Augmentin and 

cipro.


Diabetes type 2 non-insulin-dependent


Bradycardia


Hypertension


Obstructive sleep apnea


Multiple be stated BMI 41.8


Depression


Nicotine addiction


DVT prophylaxis with heparin subcu





Plan:


Patient be continued on antibiotics the form of Zosyn.  Continue the pain 

medications with Dilaudid.  Currently patient will be started on liquid diet and

advance as tolerated.  Metoprolol is on hold due to bradycardia.  Will hold 

hydrochlorothiazide as well.  Blood pressure is not elevated.  Continue with 

insulin sliding scale.  Symptomatic management for nausea.  Further 

recommendations based on the clinical course.








Time with Patient: Greater than 30

## 2019-07-29 VITALS — RESPIRATION RATE: 20 BRPM

## 2019-07-29 LAB
GLUCOSE BLD-MCNC: 117 MG/DL (ref 75–99)
GLUCOSE BLD-MCNC: 125 MG/DL (ref 75–99)
GLUCOSE BLD-MCNC: 160 MG/DL (ref 75–99)
GLUCOSE BLD-MCNC: 182 MG/DL (ref 75–99)

## 2019-07-29 RX ADMIN — LISINOPRIL SCH MG: 10 TABLET ORAL at 17:04

## 2019-07-29 RX ADMIN — HEPARIN SODIUM SCH UNIT: 5000 INJECTION, SOLUTION INTRAVENOUS; SUBCUTANEOUS at 17:04

## 2019-07-29 RX ADMIN — PIPERACILLIN AND TAZOBACTAM SCH MLS/HR: 3; .375 INJECTION, POWDER, FOR SOLUTION INTRAVENOUS at 13:00

## 2019-07-29 RX ADMIN — HEPARIN SODIUM SCH UNIT: 5000 INJECTION, SOLUTION INTRAVENOUS; SUBCUTANEOUS at 23:05

## 2019-07-29 RX ADMIN — INSULIN ASPART SCH: 100 INJECTION, SOLUTION INTRAVENOUS; SUBCUTANEOUS at 17:10

## 2019-07-29 RX ADMIN — CEFAZOLIN SCH MLS/HR: 330 INJECTION, POWDER, FOR SOLUTION INTRAMUSCULAR; INTRAVENOUS at 13:00

## 2019-07-29 RX ADMIN — CEFAZOLIN SCH MLS/HR: 330 INJECTION, POWDER, FOR SOLUTION INTRAMUSCULAR; INTRAVENOUS at 03:15

## 2019-07-29 RX ADMIN — INSULIN ASPART SCH UNIT: 100 INJECTION, SOLUTION INTRAVENOUS; SUBCUTANEOUS at 07:57

## 2019-07-29 RX ADMIN — HYDROMORPHONE HYDROCHLORIDE PRN MG: 1 INJECTION, SOLUTION INTRAMUSCULAR; INTRAVENOUS; SUBCUTANEOUS at 08:05

## 2019-07-29 RX ADMIN — PIPERACILLIN AND TAZOBACTAM SCH MLS/HR: 3; .375 INJECTION, POWDER, FOR SOLUTION INTRAVENOUS at 20:54

## 2019-07-29 RX ADMIN — HYDROMORPHONE HYDROCHLORIDE PRN MG: 1 INJECTION, SOLUTION INTRAMUSCULAR; INTRAVENOUS; SUBCUTANEOUS at 17:05

## 2019-07-29 RX ADMIN — CEFAZOLIN SCH MLS/HR: 330 INJECTION, POWDER, FOR SOLUTION INTRAMUSCULAR; INTRAVENOUS at 17:07

## 2019-07-29 RX ADMIN — PIPERACILLIN AND TAZOBACTAM SCH MLS/HR: 3; .375 INJECTION, POWDER, FOR SOLUTION INTRAVENOUS at 03:16

## 2019-07-29 RX ADMIN — INSULIN ASPART SCH UNIT: 100 INJECTION, SOLUTION INTRAVENOUS; SUBCUTANEOUS at 20:54

## 2019-07-29 RX ADMIN — HEPARIN SODIUM SCH UNIT: 5000 INJECTION, SOLUTION INTRAVENOUS; SUBCUTANEOUS at 07:57

## 2019-07-29 RX ADMIN — INSULIN ASPART SCH: 100 INJECTION, SOLUTION INTRAVENOUS; SUBCUTANEOUS at 12:29

## 2019-07-29 NOTE — P.PN
Subjective


Progress Note Date: 07/29/19


Principal diagnosis: 





Acute Diverticulitis





Patient is a 54-year-old male with a known history of diabetes type 2 

non-insulin-dependent, hypertension, upset to sleep apnea and recurrent 

diverticulitis came to ER with complaints of left lower quadrant abdominal pain.

Patient has been followed by his primary care provider Dr. Mclain and has 

been taking oral antibiotics for presumed diverticulitis.  Patient reports 

states that approximately 1.5 weeks of ciprofloxacin, he discontinued taking 

ciprofloxacin on Tuesday due to a rash and itchiness that he developed.  Patient

had his labs drawn by his primary care provider, the results returned today 

which showed that he is not having laboratory improvements.  He was then 

recommended to present to the ED for presumed admission and IV antibiotics.  

Patient reports that he has a severe ALLERGY to Keflex, however has been taking 

penicillins without difficulty.  He reports recently taking Augmentin without 

difficulty.  Denies any other complaints at this time.  Denies any nausea 

vomiting diarrhea, chest pain or shortness of breath.  Does report that he has 

had a subjective waxing and waning fever over this time period. 





CT abdomen and pelvis showed mild diverticulitis involving the proximal most 

descending colon.





7/28/2019


Patient states that his abdominal pain is better.  Still having some left lower 

quadrant pain.  No leukocytosis.  No fever no chills.  Patient has been 

controlled on IV pain medications.  Started on is being aggressive.  Continued 

on IV antibiotics in the form of Zosyn.  Anticipate discharge next 24 hours with

significant improvement.  Heart rate is in 50s.  Metoprolol is on hold.  No 

history of coronary artery disease as per patient.





07/29/2019


Patient's abdominal pain is better.  No complaints of nausea or vomiting.  No 

chest pain or shortness of breath.  Diet will be advanced to consistent carb 

diet.  Otherwise patient's blood pressure is elevated today.  We will reduce IV 

hydration to 75 mL per hour.


Anticipate discharge either today or tomorrow morning once patient tolerates or

al diet.





Current Medications reviewed





Objective





- Vital Signs


Vital signs: 


                                   Vital Signs











Temp  98.1 F   07/29/19 15:00


 


Pulse  66   07/29/19 15:00


 


Resp  20   07/29/19 16:00


 


BP  161/93   07/29/19 15:00


 


Pulse Ox  99   07/29/19 15:00








                                 Intake & Output











 07/28/19 07/29/19 07/29/19





 18:59 06:59 18:59


 


Intake Total  1300 


 


Balance  1300 


 


Intake:   


 


  Intake, IV Titration  1300 





  Amount   


 


    Piperacillin-Tazobactam 3  100 





    .375 gm In Sodium   





    Chloride 0.9% 100 ml @ 25   





    mls/hr IVPB Q8H [facility] Rx#:   





    [id number]   


 


    Sodium Chloride 0.9% 1,  1200 





    000 ml @ 75 mls/hr IV .   





    [id number] [facility] Rx#:[id number]   


 


Other:   


 


  Voiding Method Toilet Toilet Toilet


 


  # Voids 4 1 3














- Exam








PHYSICAL EXAMINATION: 


Patient is lying in the bed comfortably, no acute distress, awake alert and 

oriented.. 


HEENT: Normocephalic. Neck is supple. Pupils reactive. Nostrils clear. Oral 

cavity is moist. Ears reveal no drainage. 


Neck reveals no JVD, carotid bruits, or thyromegaly. 


CHEST EXAMINATION: Trachea is central. Symmetrical expansion. Lung fields clear 

to auscultation and percussion. 


CARDIAC: Normal S1, S2 with no gallops. No murmurs 


ABDOMEN: Soft.  Left lower quadrant tenderness.  No guarding no rigidity.  Bowel

sounds normal. No organomegaly. No abdominal bruits. 


Extremities: reveal no edema.  No clubbing or cyanosis


Neurologically awake, alert, oriented x3 with well-coordinated movements.  No 

focal deficits noted


Skin: No rash or skin lesions. 


Psychiatric: Coperative.  Nonsuicidal


Musculoskeletal: No joint swelling or deformity.  Normal range of motion.








- Labs


CBC & Chem 7: 


                                 07/27/19 10:55





                                 07/27/19 10:55


Labs: 


                  Abnormal Lab Results - Last 24 Hours (Table)











  07/28/19 07/28/19 07/29/19 Range/Units





  16:50 20:58 07:05 


 


POC Glucose (mg/dL)  128 H  168 H  160 H  (75-99)  mg/dL














  07/29/19 Range/Units





  11:55 


 


POC Glucose (mg/dL)  125 H  (75-99)  mg/dL








                      Microbiology - Last 24 Hours (Table)











 07/26/19 19:53 Blood Culture - Preliminary





 Blood    No Growth after 48 hours














Assessment and Plan


Assessment: 





Acute recurrent diverticulitis.  Failed outpatient therapy with cipro.


Diabetes type 2 non-insulin-dependent


Bradycardia.  Metoprolol is on hold.


Hypertension


Obstructive sleep apnea


Multiple be stated BMI 41.8


Depression


Nicotine addiction


DVT prophylaxis with heparin subcu





Plan:


Patient be continued on antibiotics the form of Zosyn.  Continue the pain 

medications with Dilaudid.  Currently patient will be started on liquid diet and

advance as tolerated.  Metoprolol is on hold due to bradycardia.  Will hold 

hydrochlorothiazide as well.  Blood pressure is not elevated.  Continue with 

insulin sliding scale.  Symptomatic management for nausea.  Further 

recommendations based on the clinical course.








Time with Patient: Greater than 30

## 2019-07-30 VITALS — TEMPERATURE: 98.4 F | DIASTOLIC BLOOD PRESSURE: 98 MMHG | HEART RATE: 61 BPM | SYSTOLIC BLOOD PRESSURE: 173 MMHG

## 2019-07-30 LAB
ANION GAP SERPL CALC-SCNC: 8 MMOL/L
BASOPHILS # BLD AUTO: 0 K/UL (ref 0–0.2)
BASOPHILS NFR BLD AUTO: 0 %
BUN SERPL-SCNC: 5 MG/DL (ref 9–20)
CALCIUM SPEC-MCNC: 8.8 MG/DL (ref 8.4–10.2)
CHLORIDE SERPL-SCNC: 106 MMOL/L (ref 98–107)
CO2 SERPL-SCNC: 27 MMOL/L (ref 22–30)
EOSINOPHIL # BLD AUTO: 0.2 K/UL (ref 0–0.7)
EOSINOPHIL NFR BLD AUTO: 3 %
ERYTHROCYTE [DISTWIDTH] IN BLOOD BY AUTOMATED COUNT: 4.79 M/UL (ref 4.3–5.9)
ERYTHROCYTE [DISTWIDTH] IN BLOOD: 13 % (ref 11.5–15.5)
GLUCOSE BLD-MCNC: 128 MG/DL (ref 75–99)
GLUCOSE BLD-MCNC: 139 MG/DL (ref 75–99)
GLUCOSE SERPL-MCNC: 198 MG/DL (ref 74–99)
HCT VFR BLD AUTO: 42.8 % (ref 39–53)
HGB BLD-MCNC: 13.8 GM/DL (ref 13–17.5)
LYMPHOCYTES # SPEC AUTO: 2.1 K/UL (ref 1–4.8)
LYMPHOCYTES NFR SPEC AUTO: 22 %
MCH RBC QN AUTO: 28.8 PG (ref 25–35)
MCHC RBC AUTO-ENTMCNC: 32.2 G/DL (ref 31–37)
MCV RBC AUTO: 89.5 FL (ref 80–100)
MONOCYTES # BLD AUTO: 0.5 K/UL (ref 0–1)
MONOCYTES NFR BLD AUTO: 5 %
NEUTROPHILS # BLD AUTO: 6.4 K/UL (ref 1.3–7.7)
NEUTROPHILS NFR BLD AUTO: 68 %
PLATELET # BLD AUTO: 252 K/UL (ref 150–450)
POTASSIUM SERPL-SCNC: 4.3 MMOL/L (ref 3.5–5.1)
SODIUM SERPL-SCNC: 141 MMOL/L (ref 137–145)
WBC # BLD AUTO: 9.4 K/UL (ref 3.8–10.6)

## 2019-07-30 RX ADMIN — CEFAZOLIN SCH: 330 INJECTION, POWDER, FOR SOLUTION INTRAMUSCULAR; INTRAVENOUS at 06:10

## 2019-07-30 RX ADMIN — INSULIN ASPART SCH: 100 INJECTION, SOLUTION INTRAVENOUS; SUBCUTANEOUS at 13:32

## 2019-07-30 RX ADMIN — PIPERACILLIN AND TAZOBACTAM SCH: 3; .375 INJECTION, POWDER, FOR SOLUTION INTRAVENOUS at 13:31

## 2019-07-30 RX ADMIN — LISINOPRIL SCH MG: 10 TABLET ORAL at 08:09

## 2019-07-30 RX ADMIN — HEPARIN SODIUM SCH UNIT: 5000 INJECTION, SOLUTION INTRAVENOUS; SUBCUTANEOUS at 08:09

## 2019-07-30 RX ADMIN — PIPERACILLIN AND TAZOBACTAM SCH MLS/HR: 3; .375 INJECTION, POWDER, FOR SOLUTION INTRAVENOUS at 03:04

## 2019-07-30 RX ADMIN — INSULIN ASPART SCH: 100 INJECTION, SOLUTION INTRAVENOUS; SUBCUTANEOUS at 07:26

## 2019-07-30 NOTE — P.DS
Providers


Date of admission: 


07/26/19 22:05





Expected date of discharge: 07/30/19


Attending physician: 


Jose Tafoya MD





Primary care physician: 


Juan Carlos Ortiz





[facility] Hospital Course: 





Discharge diagnosis.


Acute recurrent diverticulitis.  Failed outpatient therapy with cipro.


Diabetes type 2 non-insulin-dependent


Bradycardia.  Metoprolol is on hold.


Hypertension


Obstructive sleep apnea


Multiple be stated BMI 41.8


Depression


Nicotine addiction


DVT prophylaxis with heparin subcu





Hospital course


Patient is a 54-year-old male with a known history of diabetes type 2 

non-insulin-dependent, hypertension, upset to sleep apnea and recurrent divertic

ulitis came to ER with complaints of left lower quadrant abdominal pain. Patient

has been followed by his primary care provider Dr. Mclain and has been 

taking oral antibiotics for presumed diverticulitis.  Patient reports states 

that approximately 1.5 weeks of ciprofloxacin, he discontinued taking 

ciprofloxacin on Tuesday due to a rash and itchiness that he developed.  Patient

had his labs drawn by his primary care provider, the results returned today 

which showed that he is not having laboratory improvements.  He was then 

recommended to present to the ED for presumed admission and IV antibiotics.  

Patient reports that he has a severe ALLERGY to Keflex, however has been taking 

penicillins without difficulty.  He reports recently taking Augmentin without 

difficulty.  Denies any other complaints at this time.  Denies any nausea 

vomiting diarrhea, chest pain or shortness of breath.  Does report that he has 

had a subjective waxing and waning fever over this time period. 





CT abdomen and pelvis showed mild diverticulitis involving the proximal most 

descending colon.





7/28/2019


Patient states that his abdominal pain is better.  Still having some left lower 

quadrant pain.  No leukocytosis.  No fever no chills.  Patient has been 

controlled on IV pain medications.  Started on is being aggressive.  Continued 

on IV antibiotics in the form of Zosyn.  Anticipate discharge next 24 hours with

significant improvement.  Heart rate is in 50s.  Metoprolol is on hold.  No 

history of coronary artery disease as per patient.





07/29/2019


Patient's abdominal pain is better.  No complaints of nausea or vomiting.  No 

chest pain or shortness of breath.  Diet will be advanced to consistent carb 

diet.  Otherwise patient's blood pressure is elevated today.  We will reduce IV 

hydration to 75 mL per hour.


Anticipate discharge either today or tomorrow morning once patient tolerates 

oral diet.





07/30/2019


Patient denied any complaints of abdominal pain today.  Tolerating diabetic 

diet.  No nausea vomiting.  No fever no chills.  Patient was recommended to 

continue with Augmentin which he already had at home for the next 3 more days.  

Recommended to follow with primary physician in next 3-5 days.





My normal physical exam


PHYSICAL EXAMINATION: 


Patient is lying in the bed comfortably, no acute distress, awake alert and 

oriented.. 


HEENT: Normocephalic. Neck is supple. Pupils reactive. Nostrils clear. Oral 

cavity is moist. Ears reveal no drainage. 


Neck reveals no JVD, carotid bruits, or thyromegaly. 


CHEST EXAMINATION: Trachea is central. Symmetrical expansion. Lung fields clear 

to auscultation and percussion. 


CARDIAC: Normal S1, S2 with no gallops. No murmurs 


ABDOMEN: Soft. Bowel sounds normal. No organomegaly. No abdominal bruits. 


Extremities: reveal no edema.  No clubbing or cyanosis


Neurologically awake, alert, oriented x3 with well-coordinated movements.  No 

focal deficits noted


Skin: No rash or skin lesions. 


Psychiatric: Coperative.  Nonsuicidal


Musculoskeletal: No joint swelling or deformity.  Normal range of motion.








                                   Vital Signs











  07/30/19





  12:17


 


Temperature 98.4 F


 


Pulse Rate [ 61





Pulse Oximetery 





] 


 


Respiratory 20





Rate 


 


Blood Pressure 173/98





[Right Arm] 


 


O2 Sat by Pulse 96





Oximetry 








head








Patient Condition at Discharge: Good





Plan - Discharge Summary


New Discharge Prescriptions: 


New


   Lisinopril [Zestril] 10 mg PO DAILY #30 tab





Continue


   PARoxetine HCL [Paxil Cr] 37.5 mg PO DAILY


   Hydrochlorothiazide [Hydrodiuril] 25 mg PO DAILY


   metFORMIN HCL ER [Glucophage Xr] 1,000 mg PO BID


   Empagliflozin [Jardiance] 25 mg PO DAILY


   Amoxic-Pot Clav 875-125Mg [Augmentin 875-125] 1 tab PO Q12HR


   Ergocalciferol (Vitamin D2) [Vitamin D2] 50,000 unit PO Q7D





Discontinued


   Metoprolol Succinate [Toprol XL] 200 mg PO DAILY


Discharge Medication List





Hydrochlorothiazide [Hydrodiuril] 25 mg PO DAILY 06/21/18 [History]


PARoxetine HCL [Paxil Cr] 37.5 mg PO DAILY 06/21/18 [History]


Amoxic-Pot Clav 875-125Mg [Augmentin 875-125] 1 tab PO Q12HR 07/26/19 [History]


Empagliflozin [Jardiance] 25 mg PO DAILY 07/26/19 [History]


Ergocalciferol (Vitamin D2) [Vitamin D2] 50,000 unit PO Q7D 07/26/19 [History]


metFORMIN HCL ER [Glucophage Xr] 1,000 mg PO BID 07/26/19 [History]


Lisinopril [Zestril] 10 mg PO DAILY #30 tab 07/30/19 [Rx]








Follow up Appointment(s)/Referral(s): 


Juan Carlos Ortiz DO [Primary Care Provider] - 07/31/19 2:20 pm


Patient Instructions/Handouts:  Diverticulitis (DC), Diverticulosis (DC), Type 2

Diabetes in Adults: New Diagnosis (DC), Diverticulitis Diet (DC), Diverticulosis

Diet (GEN)


Activity/Diet/Wound Care/Special Instructions: 


OK TO RETURN TO WORK FRIDAY AUGUST 2, 2019


Discharge Disposition: HOME SELF-CARE

## 2021-11-03 ENCOUNTER — HOSPITAL ENCOUNTER (OUTPATIENT)
Dept: HOSPITAL 47 - RADXRYALE | Age: 56
Discharge: HOME | End: 2021-11-03
Attending: PHYSICIAN ASSISTANT
Payer: COMMERCIAL

## 2021-11-03 DIAGNOSIS — M19.012: Primary | ICD-10-CM

## 2021-11-04 NOTE — XR
EXAMINATION TYPE: XR shoulder complete 3 views LT

 

DATE OF EXAM: 11/3/2021

 

Comparison: None

 

Clinical History: 56-year-old male [id number] LT shoulder PAIN

 

Findings:

Glenohumeral joint is intact. No acute fracture, subluxation, or dislocation. Mild degenerative rao
e AC joint with marginal spurring. No tendinous or bursal calcifications. Smooth delineation to the g
reater tuberosity.

 

 

Impression:

Mild AC joint OA. No acute osseous abnormality seen.

## 2023-08-29 ENCOUNTER — HOSPITAL ENCOUNTER (OUTPATIENT)
Dept: HOSPITAL 47 - RADXRYALE | Age: 58
Discharge: HOME | End: 2023-08-29
Attending: PHYSICIAN ASSISTANT
Payer: COMMERCIAL

## 2023-08-29 DIAGNOSIS — S90.922A: Primary | ICD-10-CM
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
## 2023-08-29 NOTE — XR
EXAMINATION TYPE: XR foot complete LT

 

DATE OF EXAM: 8/29/2023

 

CLINICAL HISTORY: pain

 

TECHNIQUE: Frontal, lateral and oblique images of the left foot are obtained.

 

COMPARISON: None.

 

FINDINGS:  There is no acute fracture/dislocation evident. The joint spaces  appear within normal phipps
its.  The overlying soft tissue appears unremarkable.

 

IMPRESSION:  

There is no acute fracture or dislocation.

 

ICD 10 NO FRACTURE, INITIAL EVALUATION